# Patient Record
Sex: FEMALE | Race: WHITE | NOT HISPANIC OR LATINO | Employment: UNEMPLOYED | URBAN - NONMETROPOLITAN AREA
[De-identification: names, ages, dates, MRNs, and addresses within clinical notes are randomized per-mention and may not be internally consistent; named-entity substitution may affect disease eponyms.]

---

## 2017-01-03 RX ORDER — CITALOPRAM 20 MG/1
20 TABLET ORAL DAILY
Qty: 30 TABLET | Refills: 5 | Status: SHIPPED | OUTPATIENT
Start: 2017-01-03 | End: 2017-07-25 | Stop reason: SDUPTHER

## 2017-01-03 RX ORDER — LOSARTAN POTASSIUM 100 MG/1
100 TABLET ORAL DAILY
Qty: 30 TABLET | Refills: 5 | Status: SHIPPED | OUTPATIENT
Start: 2017-01-03 | End: 2017-07-25 | Stop reason: SDUPTHER

## 2017-01-03 RX ORDER — HYDROCHLOROTHIAZIDE 25 MG/1
25 TABLET ORAL DAILY
Qty: 30 TABLET | Refills: 5 | Status: SHIPPED | OUTPATIENT
Start: 2017-01-03 | End: 2017-07-25 | Stop reason: SDUPTHER

## 2017-01-03 RX ORDER — TRIAMCINOLONE ACETONIDE 5 MG/G
1 CREAM TOPICAL 2 TIMES DAILY
Qty: 15 G | Refills: 5 | Status: SHIPPED | OUTPATIENT
Start: 2017-01-03

## 2017-01-03 RX ORDER — ALBUTEROL SULFATE 90 UG/1
2 AEROSOL, METERED RESPIRATORY (INHALATION) EVERY 4 HOURS PRN
Qty: 1 INHALER | Refills: 5 | Status: SHIPPED | OUTPATIENT
Start: 2017-01-03 | End: 2017-07-25 | Stop reason: SDUPTHER

## 2017-01-03 RX ORDER — NICOTINE 21 MG/24HR
1 PATCH, TRANSDERMAL 24 HOURS TRANSDERMAL EVERY 24 HOURS
Qty: 28 PATCH | Refills: 5 | Status: SHIPPED | OUTPATIENT
Start: 2017-01-03 | End: 2017-07-25 | Stop reason: SDUPTHER

## 2017-01-03 RX ORDER — HYDROCODONE BITARTRATE AND ACETAMINOPHEN 10; 325 MG/1; MG/1
TABLET ORAL
Status: CANCELLED | OUTPATIENT
Start: 2017-01-03

## 2017-01-03 RX ORDER — ARIPIPRAZOLE 5 MG/1
5 TABLET ORAL NIGHTLY
Qty: 30 TABLET | Refills: 5 | Status: SHIPPED | OUTPATIENT
Start: 2017-01-03 | End: 2017-07-25 | Stop reason: SDUPTHER

## 2017-01-16 RX ORDER — ALBUTEROL SULFATE 2.5 MG/3ML
2.5 SOLUTION RESPIRATORY (INHALATION) EVERY 6 HOURS PRN
Qty: 360 ML | Refills: 11 | Status: SHIPPED | OUTPATIENT
Start: 2017-01-16 | End: 2017-01-17 | Stop reason: SDUPTHER

## 2017-01-17 ENCOUNTER — TELEPHONE (OUTPATIENT)
Dept: FAMILY MEDICINE CLINIC | Facility: CLINIC | Age: 57
End: 2017-01-17

## 2017-01-17 RX ORDER — ALBUTEROL SULFATE 2.5 MG/3ML
2.5 SOLUTION RESPIRATORY (INHALATION) EVERY 6 HOURS PRN
Qty: 360 ML | Refills: 11 | Status: SHIPPED | OUTPATIENT
Start: 2017-01-17 | End: 2017-01-19 | Stop reason: SDUPTHER

## 2017-01-17 NOTE — TELEPHONE ENCOUNTER
----- Message from Cassandra Gonzalez sent at 1/16/2017  9:43 AM CST -----  PATIENT CALLED IN FOR REFILL ON A albuterol FOR A NEBULIZER / SHE SAYS SHE HAS BEEN TAKING IT FOR YEARS AND JUST RAN OUT OF REFILLS

## 2017-01-19 RX ORDER — ALBUTEROL SULFATE 2.5 MG/3ML
2.5 SOLUTION RESPIRATORY (INHALATION) EVERY 6 HOURS PRN
Qty: 360 ML | Refills: 11 | Status: SHIPPED | OUTPATIENT
Start: 2017-01-19

## 2017-02-07 DIAGNOSIS — Z12.31 VISIT FOR SCREENING MAMMOGRAM: Primary | ICD-10-CM

## 2017-03-02 RX ORDER — SULFAMETHOXAZOLE AND TRIMETHOPRIM 400; 80 MG/1; MG/1
1 TABLET ORAL 2 TIMES DAILY
Qty: 20 TABLET | Refills: 0 | Status: SHIPPED | OUTPATIENT
Start: 2017-03-02 | End: 2019-02-12

## 2017-07-25 RX ORDER — LOSARTAN POTASSIUM 100 MG/1
100 TABLET ORAL DAILY
Qty: 14 TABLET | Refills: 0 | Status: SHIPPED | OUTPATIENT
Start: 2017-07-25

## 2017-07-25 RX ORDER — HYDROCHLOROTHIAZIDE 25 MG/1
25 TABLET ORAL DAILY
Qty: 14 TABLET | Refills: 0 | Status: SHIPPED | OUTPATIENT
Start: 2017-07-25

## 2017-07-25 RX ORDER — ARIPIPRAZOLE 5 MG/1
5 TABLET ORAL NIGHTLY
Qty: 14 TABLET | Refills: 0 | Status: SHIPPED | OUTPATIENT
Start: 2017-07-25

## 2017-07-25 RX ORDER — ALBUTEROL SULFATE 90 UG/1
2 AEROSOL, METERED RESPIRATORY (INHALATION) EVERY 4 HOURS PRN
Qty: 1 INHALER | Refills: 5 | Status: SHIPPED | OUTPATIENT
Start: 2017-07-25

## 2017-07-25 RX ORDER — CITALOPRAM 20 MG/1
20 TABLET ORAL DAILY
Qty: 14 TABLET | Refills: 0 | Status: SHIPPED | OUTPATIENT
Start: 2017-07-25

## 2017-07-25 RX ORDER — NICOTINE 21 MG/24HR
1 PATCH, TRANSDERMAL 24 HOURS TRANSDERMAL EVERY 24 HOURS
Qty: 28 PATCH | Refills: 5 | Status: SHIPPED | OUTPATIENT
Start: 2017-07-25

## 2019-02-12 ENCOUNTER — OFFICE VISIT (OUTPATIENT)
Dept: OTOLARYNGOLOGY | Facility: CLINIC | Age: 59
End: 2019-02-12

## 2019-02-12 ENCOUNTER — CLINICAL SUPPORT (OUTPATIENT)
Dept: AUDIOLOGY | Facility: CLINIC | Age: 59
End: 2019-02-12

## 2019-02-12 VITALS — HEIGHT: 62 IN | BODY MASS INDEX: 44.9 KG/M2 | WEIGHT: 244 LBS | RESPIRATION RATE: 18 BRPM

## 2019-02-12 DIAGNOSIS — H91.8X3 OTHER SPECIFIED HEARING LOSS, BILATERAL: Primary | ICD-10-CM

## 2019-02-12 DIAGNOSIS — H90.6 MIXED CONDUCTIVE AND SENSORINEURAL HEARING LOSS, BILATERAL: Primary | ICD-10-CM

## 2019-02-12 DIAGNOSIS — H93.12 TINNITUS OF LEFT EAR: ICD-10-CM

## 2019-02-12 PROCEDURE — 99203 OFFICE O/P NEW LOW 30 MIN: CPT | Performed by: OTOLARYNGOLOGY

## 2019-02-12 RX ORDER — LISINOPRIL AND HYDROCHLOROTHIAZIDE 25; 20 MG/1; MG/1
1 TABLET ORAL DAILY
COMMUNITY

## 2019-02-12 RX ORDER — BUSPIRONE HYDROCHLORIDE 5 MG/1
5 TABLET ORAL 2 TIMES DAILY
COMMUNITY

## 2019-02-12 NOTE — PROGRESS NOTES
STANDARD AUDIOMETRIC EVALUATION      Name:  Brittney Adames  :  1960  Age:  58 y.o.  Date of Evaluation:  2019      HISTORY    Reason for visit:  Brittney Adames is seen today for a hearing evaluation at the request of Dr. Smith Mondragon.  Patient reports she hears a roaring in her left ear for the past several years, and now it is more constant.  She reports a possible hearing loss and occasional dizziness.        EVALUATION    See Audiogram    RESULTS        Otoscopy and Tympanometry 226 Hz :  Right Ear:  Otoscopy:  Clear ear canal          Tympanometry:  Negative middle ear pressure    Left Ear:   Otoscopy:  Clear ear canal        Tympanometry:  Middle ear function within normal limits    Test technique:  Standard Audiometry     Pure Tone Audiometry:   Patient responded to pure tones at 15-60 dB for 250-8000 Hz in right ear, and at  dB for 250-8000 Hz in left ear.       Speech Audiometry:        Right Ear:  Speech Reception Threshold (SRT) was obtained at 20 dBHL                 Speech Discrimination scores were 100% in quiet when words were presented at 60 dBHL       Left Ear:  Speech Reception Threshold (SRT) was obtained at 30 dBHL                 Speech Discrimination scores were 96% in quiet when words were presented at 70 dBHL    Reliability:   good    IMPRESSIONS:  1.  Tympanometry results are consistent with Negative middle ear pressure in right ear, and Middle ear function within normal limits in left ear.  2.  Pure tone results are consistent with normal to moderate sloping mixed hearing loss for right ear, and mild to profound sloping mainly sensorineural hearing loss  in left ear.       RECOMMENDATIONS:  Patient is seeing the Ear Nose and Throat physician immediately following this examination.  It was a pleasure seeing Brittney Adames in Audiology today.  We would be happy to do further testing or discuss these test as necessary.          This document has been electronically  signed by Rajni Nayak, MS CARLOS-A on February 12, 2019 2:56 PM       Rajni Nayak, MS CARLOS-A  Licensed Audiologist

## 2019-02-14 NOTE — PROGRESS NOTES
Subjective   Brittney Adames is a 58 y.o. female.     History of Present Illness   Patient reports she has had ringing in her left ear for several years.  Nothing in particular brought this on.  It is worse in a quiet environment.  She can tell her hearing is decreased.  She thinks there may be a family history of hearing loss on her mother side.  She also has a significant history of noise exposure.  No previous otologic surgery.  No otorrhea, no otalgia.      The following portions of the patient's history were reviewed and updated as appropriate: allergies, current medications, past family history, past medical history, past social history, past surgical history and problem list.      Brittney Adames reports that she has been smoking.  she has never used smokeless tobacco. She reports that she does not drink alcohol or use drugs.  Patient is a tobacco user and has been counseled for use of tobacco products    Family History   Problem Relation Age of Onset   • Heart disease Mother    • Hypertension Mother    • Rectal cancer Other    • Cancer Paternal Aunt    • Cancer Paternal Uncle    • Cancer Maternal Grandmother    • Cancer Paternal Grandfather        No Known Allergies      Current Outpatient Medications:   •  albuterol (PROVENTIL HFA;VENTOLIN HFA) 108 (90 BASE) MCG/ACT inhaler, Inhale 2 puffs Every 4 (Four) Hours As Needed for Wheezing., Disp: 1 inhaler, Rfl: 5  •  albuterol (PROVENTIL) (2.5 MG/3ML) 0.083% nebulizer solution, Take 2.5 mg by nebulization Every 6 (Six) Hours As Needed for wheezing. DX: J44.9, Disp: 360 mL, Rfl: 11  •  aspirin 81 MG EC tablet, Take 81 mg by mouth daily., Disp: , Rfl:   •  BREO ELLIPTA 100-25 MCG/INH aerosol powder , , Disp: , Rfl:   •  busPIRone (BUSPAR) 5 MG tablet, Take 5 mg by mouth 2 (Two) Times a Day., Disp: , Rfl:   •  citalopram (CeleXA) 20 MG tablet, Take 1 tablet by mouth Daily., Disp: 14 tablet, Rfl: 0  •  HYDROcodone-acetaminophen (NORCO)  MG per tablet, , Disp:  , Rfl:   •  lisinopril-hydrochlorothiazide (PRINZIDE,ZESTORETIC) 20-25 MG per tablet, Take 1 tablet by mouth Daily., Disp: , Rfl:   •  ARIPiprazole (ABILIFY) 5 MG tablet, Take 1 tablet by mouth Every Night., Disp: 14 tablet, Rfl: 0  •  conjugated estrogens (PREMARIN) 0.625 MG/GM vaginal cream, Insert  into the vagina Daily., Disp: 30 g, Rfl: 5  •  hydrochlorothiazide (HYDRODIURIL) 25 MG tablet, Take 1 tablet by mouth Daily., Disp: 14 tablet, Rfl: 0  •  losartan (COZAAR) 100 MG tablet, Take 1 tablet by mouth Daily., Disp: 14 tablet, Rfl: 0  •  nicotine (NICODERM CQ) 21 MG/24HR patch, Place 1 patch on the skin Daily., Disp: 28 patch, Rfl: 5  •  tiotropium (SPIRIVA) 18 MCG per inhalation capsule, Place 1 capsule into inhaler and inhale Daily., Disp: 30 capsule, Rfl: 5  •  triamcinolone (KENALOG) 0.5 % cream, Apply 1 application topically 2 (Two) Times a Day., Disp: 15 g, Rfl: 5    Past Medical History:   Diagnosis Date   • Abnormal glucose level    • Acute bronchitis    • Acute maxillary sinusitis    • Atrophic vaginitis    • Candidal vulvovaginitis    • Carpal tunnel syndrome    • COPD (chronic obstructive pulmonary disease) (CMS/[facility])    • Counseling on substance use and abuse    • Depressive disorder    • DJD (degenerative joint disease)     involving multiple joints   • Dyspnea    • Dystrophy of vulva    • Essential hypertension    • Feeling stressed out    • Hip pain    • Hypertensive disorder    • Menopause    • Obesity    • Tobacco dependence syndrome    • Visit for gynecologic examination    • Vulvovaginitis          Review of Systems   HENT: Positive for congestion.    Respiratory: Positive for shortness of breath.    Musculoskeletal: Positive for arthralgias and back pain.   All other systems reviewed and are negative.          Objective   Physical Exam    General: Well-developed well-nourished female in no acute distress.  Alert and oriented x-3. Head: Normocephalic. Face: Symmetrical strength and appearance.  PERRL. EOMI. Voice:Strong. Speech:Fluent  Ears: External ears no deformity, canals no discharge, tympanic membranes intact somewhat thin and slightly hypermobile but with no evidence of infection or effusion bilaterally  Nose: Nares show no discharge mass polyp or purulence.  Boggy mucosa is present.  No gross external deformity.  Septum: Midline  Oral cavity: Lips and gums without lesions.  Tongue and floor of mouth without lesions.  Parotid and submandibular ducts unobstructed.  No mucosal lesions on the buccal mucosa or vestibule of the mouth.  Pharynx: No erythema exudate mass or ulcer  Neck: No lymphadenopathy.  No thyromegaly.  Trachea and larynx midline.  No masses in the parotid or submandibular glands.    Audiogram is obtained and reviewed and shows a bilateral mixed hearing loss that is worse on the left than the right.  The sensorineural components are reasonably symmetrical.  Tympanograms are hypermobile bilaterally.  Discrimination scores are 100% on the right 96% on the left.    Assessment/Plan   Brittney was seen today for tinnitus.    Diagnoses and all orders for this visit:    Mixed conductive and sensorineural hearing loss, bilateral    Tinnitus of left ear        Plan: Explained the nature of tinnitus to the patient and its relationship to hearing loss.  Advised using masking noise as needed.  Told her if she wanted to get a hearing aid for her left ear due to perceived hearing loss this might also help tinnitus but I would not recommend she get it for the tinnitus alone because when she takes the hearing aid out the tinnitus will recur.  She should avoid unnecessary exposure to loud noise, use ear protection when unavoidably around loud noise.  Return in 1 year with another hearing test and call sooner for problems.